# Patient Record
Sex: FEMALE
[De-identification: names, ages, dates, MRNs, and addresses within clinical notes are randomized per-mention and may not be internally consistent; named-entity substitution may affect disease eponyms.]

---

## 2018-08-02 ENCOUNTER — HOSPITAL ENCOUNTER (EMERGENCY)
Dept: HOSPITAL 14 - H.ER | Age: 48
Discharge: HOME | End: 2018-08-02
Payer: COMMERCIAL

## 2018-08-02 VITALS — HEART RATE: 88 BPM | SYSTOLIC BLOOD PRESSURE: 126 MMHG | DIASTOLIC BLOOD PRESSURE: 82 MMHG | RESPIRATION RATE: 18 BRPM

## 2018-08-02 VITALS — TEMPERATURE: 98 F | OXYGEN SATURATION: 98 %

## 2018-08-02 DIAGNOSIS — R07.89: ICD-10-CM
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
## 2018-08-02 DIAGNOSIS — S40.012A: Primary | ICD-10-CM
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
## 2018-08-02 DIAGNOSIS — F41.9: ICD-10-CM

## 2018-08-02 LAB
ALBUMIN SERPL-MCNC: 4.5 G/DL (ref 3.5–5)
ALBUMIN/GLOB SERPL: 1.3 {RATIO} (ref 1–2.1)
ALT SERPL-CCNC: 16 U/L (ref 9–52)
AST SERPL-CCNC: 27 U/L (ref 14–36)
BASOPHILS # BLD AUTO: 0.1 K/UL (ref 0–0.2)
BASOPHILS NFR BLD: 1 % (ref 0–2)
BUN SERPL-MCNC: 10 MG/DL (ref 7–17)
CALCIUM SERPL-MCNC: 9.1 MG/DL (ref 8.4–10.2)
EOSINOPHIL # BLD AUTO: 0.1 K/UL (ref 0–0.7)
EOSINOPHIL NFR BLD: 1.1 % (ref 0–4)
ERYTHROCYTE [DISTWIDTH] IN BLOOD BY AUTOMATED COUNT: 15.5 % (ref 11.5–14.5)
GFR NON-AFRICAN AMERICAN: > 60
HGB BLD-MCNC: 12.1 G/DL (ref 12–16)
LYMPHOCYTES # BLD AUTO: 1.3 K/UL (ref 1–4.3)
LYMPHOCYTES NFR BLD AUTO: 22.6 % (ref 20–40)
MCH RBC QN AUTO: 27.8 PG (ref 27–31)
MCHC RBC AUTO-ENTMCNC: 32.2 G/DL (ref 33–37)
MCV RBC AUTO: 86.4 FL (ref 81–99)
MONOCYTES # BLD: 0.3 K/UL (ref 0–0.8)
MONOCYTES NFR BLD: 6 % (ref 0–10)
NEUTROPHILS # BLD: 3.9 K/UL (ref 1.8–7)
NEUTROPHILS NFR BLD AUTO: 69.3 % (ref 50–75)
NRBC BLD AUTO-RTO: 0 % (ref 0–0)
PLATELET # BLD: 235 K/UL (ref 130–400)
PMV BLD AUTO: 9.1 FL (ref 7.2–11.7)
RBC # BLD AUTO: 4.36 MIL/UL (ref 3.8–5.2)
WBC # BLD AUTO: 5.7 K/UL (ref 4.8–10.8)

## 2018-08-02 PROCEDURE — 81025 URINE PREGNANCY TEST: CPT

## 2018-08-02 PROCEDURE — 85025 COMPLETE CBC W/AUTO DIFF WBC: CPT

## 2018-08-02 PROCEDURE — 96374 THER/PROPH/DIAG INJ IV PUSH: CPT

## 2018-08-02 PROCEDURE — 99284 EMERGENCY DEPT VISIT MOD MDM: CPT

## 2018-08-02 PROCEDURE — 80053 COMPREHEN METABOLIC PANEL: CPT

## 2018-08-02 PROCEDURE — 84484 ASSAY OF TROPONIN QUANT: CPT

## 2018-08-02 PROCEDURE — 73030 X-RAY EXAM OF SHOULDER: CPT

## 2018-08-02 PROCEDURE — 71046 X-RAY EXAM CHEST 2 VIEWS: CPT
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
## 2018-08-02 NOTE — RAD
Date of service: 



08/02/2018



HISTORY:

L chest wall pain  



COMPARISON:

[facility]



TECHNIQUE:

Chest PA and lateral



FINDINGS:



LUNGS:

No active pulmonary disease.



PLEURA:

No significant pleural effusion identified. No pneumothorax apparent.



CARDIOVASCULAR:

 No radiographic findings to suggest acute or significant 

cardiovascular disease.



OSSEOUS STRUCTURES:

No significant abnormalities.



VISUALIZED UPPER ABDOMEN:

Normal.



OTHER FINDINGS:

None.



IMPRESSION:

No active disease. 



___________________________________________________________



Concordant results with the preliminary interpretation rendered by 

the emergency department physician

procedure.
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
